# Patient Record
Sex: MALE | Race: WHITE | NOT HISPANIC OR LATINO | Employment: FULL TIME | URBAN - METROPOLITAN AREA
[De-identification: names, ages, dates, MRNs, and addresses within clinical notes are randomized per-mention and may not be internally consistent; named-entity substitution may affect disease eponyms.]

---

## 2021-03-12 ENCOUNTER — HOSPITAL ENCOUNTER (EMERGENCY)
Facility: HOSPITAL | Age: 21
Discharge: HOME | End: 2021-03-12
Attending: EMERGENCY MEDICINE
Payer: COMMERCIAL

## 2021-03-12 VITALS
TEMPERATURE: 98 F | HEIGHT: 69 IN | WEIGHT: 205.38 LBS | DIASTOLIC BLOOD PRESSURE: 83 MMHG | RESPIRATION RATE: 16 BRPM | OXYGEN SATURATION: 96 % | SYSTOLIC BLOOD PRESSURE: 156 MMHG | HEART RATE: 98 BPM | BODY MASS INDEX: 30.42 KG/M2

## 2021-03-12 DIAGNOSIS — H57.12 LEFT EYE PAIN: Primary | ICD-10-CM

## 2021-03-12 DIAGNOSIS — H57.8A9 SENSATION OF FOREIGN BODY IN EYE: ICD-10-CM

## 2021-03-12 PROCEDURE — 25000003 PHARM REV CODE 250: Performed by: NURSE PRACTITIONER

## 2021-03-12 PROCEDURE — 90715 TDAP VACCINE 7 YRS/> IM: CPT | Performed by: NURSE PRACTITIONER

## 2021-03-12 PROCEDURE — 63600175 PHARM REV CODE 636 W HCPCS: Performed by: NURSE PRACTITIONER

## 2021-03-12 PROCEDURE — 90471 IMMUNIZATION ADMIN: CPT | Performed by: NURSE PRACTITIONER

## 2021-03-12 PROCEDURE — 99284 EMERGENCY DEPT VISIT MOD MDM: CPT | Mod: 25

## 2021-03-12 RX ORDER — HYDROCODONE BITARTRATE AND ACETAMINOPHEN 7.5; 325 MG/1; MG/1
1 TABLET ORAL EVERY 6 HOURS PRN
Qty: 12 TABLET | Refills: 0 | Status: SHIPPED | OUTPATIENT
Start: 2021-03-12 | End: 2021-03-17

## 2021-03-12 RX ORDER — ERYTHROMYCIN 5 MG/G
OINTMENT OPHTHALMIC
Status: COMPLETED | OUTPATIENT
Start: 2021-03-12 | End: 2021-03-12

## 2021-03-12 RX ORDER — ERYTHROMYCIN 5 MG/G
OINTMENT OPHTHALMIC
Qty: 1 TUBE | Refills: 0 | Status: SHIPPED | OUTPATIENT
Start: 2021-03-12

## 2021-03-12 RX ORDER — TETRACAINE HYDROCHLORIDE 5 MG/ML
2 SOLUTION OPHTHALMIC
Status: COMPLETED | OUTPATIENT
Start: 2021-03-12 | End: 2021-03-12

## 2021-03-12 RX ADMIN — CLOSTRIDIUM TETANI TOXOID ANTIGEN (FORMALDEHYDE INACTIVATED), CORYNEBACTERIUM DIPHTHERIAE TOXOID ANTIGEN (FORMALDEHYDE INACTIVATED), BORDETELLA PERTUSSIS TOXOID ANTIGEN (GLUTARALDEHYDE INACTIVATED), BORDETELLA PERTUSSIS FILAMENTOUS HEMAGGLUTININ ANTIGEN (FORMALDEHYDE INACTIVATED), BORDETELLA PERTUSSIS PERTACTIN ANTIGEN, AND BORDETELLA PERTUSSIS FIMBRIAE 2/3 ANTIGEN 0.5 ML: 5; 2; 2.5; 5; 3; 5 INJECTION, SUSPENSION INTRAMUSCULAR at 09:03

## 2021-03-12 RX ADMIN — TETRACAINE HYDROCHLORIDE 2 DROP: 5 SOLUTION OPHTHALMIC at 09:03

## 2021-03-12 RX ADMIN — ERYTHROMYCIN 1 INCH: 5 OINTMENT OPHTHALMIC at 11:03

## 2021-03-12 RX ADMIN — FLUORESCEIN SODIUM 1 EACH: 1 STRIP OPHTHALMIC at 09:03

## 2025-02-23 ENCOUNTER — HOSPITAL ENCOUNTER (EMERGENCY)
Facility: HOSPITAL | Age: 25
Discharge: HOME | End: 2025-02-23
Attending: EMERGENCY MEDICINE
Payer: COMMERCIAL

## 2025-02-23 VITALS
WEIGHT: 215 LBS | TEMPERATURE: 98 F | OXYGEN SATURATION: 98 % | SYSTOLIC BLOOD PRESSURE: 125 MMHG | DIASTOLIC BLOOD PRESSURE: 79 MMHG | HEIGHT: 69 IN | BODY MASS INDEX: 31.84 KG/M2 | HEART RATE: 89 BPM | RESPIRATION RATE: 20 BRPM

## 2025-02-23 DIAGNOSIS — S61.215A LACERATION OF LEFT RING FINGER WITHOUT FOREIGN BODY WITHOUT DAMAGE TO NAIL, INITIAL ENCOUNTER: Primary | ICD-10-CM

## 2025-02-23 PROCEDURE — 12001 RPR S/N/AX/GEN/TRNK 2.5CM/<: CPT

## 2025-02-23 PROCEDURE — 99282 EMERGENCY DEPT VISIT SF MDM: CPT

## 2025-02-23 PROCEDURE — 63600175 PHARM REV CODE 636 W HCPCS: Performed by: EMERGENCY MEDICINE

## 2025-02-23 RX ORDER — LIDOCAINE HYDROCHLORIDE 10 MG/ML
5 INJECTION, SOLUTION EPIDURAL; INFILTRATION; INTRACAUDAL; PERINEURAL
Status: COMPLETED | OUTPATIENT
Start: 2025-02-23 | End: 2025-02-23

## 2025-02-23 RX ADMIN — LIDOCAINE HYDROCHLORIDE 50 MG: 10 INJECTION, SOLUTION EPIDURAL; INFILTRATION; INTRACAUDAL; PERINEURAL at 02:02

## 2025-02-23 NOTE — ED PROVIDER NOTES
Encounter Date: 2/23/2025       History     Chief Complaint   Patient presents with    Laceration     Cut his left 3rd/4th fingers with a knife while trying to sharpen the knife     Patient is a 25 yo M with no significant PMH presents to the ER for evaluation for injury to the L fingers. Patient was sharpening a knife when it slipped and cut the dorsal surface of the 3rd and 4th digits. Patient has full range of motion and sensation is intact. He reports prior tetanus vaccine within the last 2 years. Denies any other injury.         Review of patient's allergies indicates:   Allergen Reactions    Penicillins      History reviewed. No pertinent past medical history.  History reviewed. No pertinent surgical history.  No family history on file.  Social History[1]  Review of Systems   Skin:  Positive for wound.   All other systems reviewed and are negative.      Physical Exam     Initial Vitals [02/23/25 1403]   BP Pulse Resp Temp SpO2   125/79 89 20 97.6 °F (36.4 °C) 98 %      MAP       --         Physical Exam    Constitutional: He appears well-developed and well-nourished. No distress.   HENT:   Head: Normocephalic and atraumatic.   Eyes: Conjunctivae are normal.   Cardiovascular:  Normal rate, regular rhythm, normal heart sounds and intact distal pulses.           Pulmonary/Chest: Breath sounds normal.   Abdominal: Abdomen is soft. There is no abdominal tenderness.   Musculoskeletal:      Left hand: Laceration present.      Comments: Shallow laceration over L 4th finger dorsal surface of the PIP joint . Shallow laceration of the 3rd finger dorsal aspect of the PIP.   No visible tendons, ligaments or bone. ROM intact in flexion and extension in the L 3rd and 4th digits     Neurological: He is alert and oriented to person, place, and time.   Sensation intact on bilateral edges of o 3rd and 4th L digits.   Skin: Skin is warm and dry. Capillary refill takes less than 2 seconds.         ED Course   Lac  Repair    Date/Time: 2/23/2025 2:39 PM    Performed by: Mary Toney MD  Authorized by: Mary Toney MD    Consent:     Consent obtained:  Verbal    Consent given by:  Patient    Risks, benefits, and alternatives were discussed: yes      Risks discussed:  Infection, pain, tendon damage, vascular damage, poor wound healing and nerve damage    Alternatives discussed:  No treatment  Universal protocol:     Procedure explained and questions answered to patient or proxy's satisfaction: yes      Required blood products, implants, devices, and special equipment available: yes      Immediately prior to procedure, a time out was called: yes      Patient identity confirmed:  Verbally with patient  Anesthesia:     Anesthesia method:  Local infiltration    Local anesthetic:  Lidocaine 1% w/o epi  Laceration details:     Location:  Finger    Finger location:  L ring finger    Length (cm):  1    Depth (mm):  3  Pre-procedure details:     Preparation:  Patient was prepped and draped in usual sterile fashion and imaging obtained to evaluate for foreign bodies  Exploration:     Hemostasis achieved with:  Direct pressure    Wound exploration: wound explored through full range of motion and entire depth of wound visualized      Contaminated: no    Treatment:     Area cleansed with:  Saline    Amount of cleaning:  Standard    Irrigation solution:  Sterile saline    Irrigation volume:  300    Irrigation method:  Syringe  Skin repair:     Repair method:  Sutures    Suture size:  4-0    Suture material:  Prolene    Suture technique:  Simple interrupted    Number of sutures:  3  Approximation:     Approximation:  Close  Repair type:     Repair type:  Simple  Post-procedure details:     Dressing:  Open (no dressing)    Procedure completion:  Tolerated well, no immediate complications    Labs Reviewed - No data to display       Imaging Results    None          Medications   LIDOcaine (PF) 10 mg/ml (1%) injection 50 mg (50 mg  Infiltration Given 2/23/25 1441)     Medical Decision Making  Patient is a 24-year-old male presents to the emergency department for laceration over the left 3rd and 4th digits were injured while he was sharpening a knife.  Exam shows shallow laceration over the left 3rd and 4th digit.  The laceration on 4th digit is over the PIP joint/knuckle.  When he bends the PIP there is venous oozing.  Patient has full sensation and range of motion intact in these fingers.  No other injuries noted.   Wounds cleaned and irrigated with sterile saline. Fully depth of wound visualized. The wound over the 3rd digit is so superficial that it does not need repair.  The laceration over the 4th digit was repaired with 3 prolene 4-0 sutures. Patient tolerated procedure well. Patient given wound care instructions, advised to have sutures removed in 7-10 days, and given strict return precautions. Patient and spouse agreeable with plan and have no further concerns at this time.     Risk  Prescription drug management.               ED Course as of 02/23/25 1450   Sun Feb 23, 2025   1412 Patient reports that he has a tetanus vaccine within the last 2 years [KH]      ED Course User Index  [KH] Mary Toney MD                           Clinical Impression:  Final diagnoses:  [S61.215A] Laceration of left ring finger without foreign body without damage to nail, initial encounter (Primary)          ED Disposition Condition    Discharge Stable          ED Prescriptions    None       Follow-up Information    None            [1]   Social History  Tobacco Use    Smoking status: Every Day     Types: Cigarettes    Smokeless tobacco: Never        Mary Toney MD  02/23/25 145

## 2025-02-23 NOTE — DISCHARGE INSTRUCTIONS
You were seen in the emergency room and after a injury to her hand.  You have 3 stitches in your left ring finger that need to be removed in 7-10 days.  You can have this done at this ER, her primary care doctor or urgent care.  Be on the look out for signs of infection such as redness, swelling, drainage and if this occurs please seek medical care.  You may wash the wound with regular soap and water.  Avoid prolonged submersion of the hand in water including Swimming, taking a bath, etc. for 2 weeks.  You may apply antibiotic ointment to the wound twice daily for 7 days.  Return to the emergency department any worsening to dose.

## 2025-06-05 NOTE — ED NOTES
Wound irrigated with ns, s/p suture repair per MD, wound cleansed with ns, patted dry, applied band aid and instructed pt on daily wound care and s/ sof infection.    Never